# Patient Record
Sex: FEMALE | Race: WHITE | HISPANIC OR LATINO
[De-identification: names, ages, dates, MRNs, and addresses within clinical notes are randomized per-mention and may not be internally consistent; named-entity substitution may affect disease eponyms.]

---

## 2020-07-29 DIAGNOSIS — Z01.818 ENCOUNTER FOR OTHER PREPROCEDURAL EXAMINATION: ICD-10-CM

## 2020-07-31 ENCOUNTER — APPOINTMENT (OUTPATIENT)
Dept: DISASTER EMERGENCY | Facility: CLINIC | Age: 45
End: 2020-07-31

## 2020-07-31 LAB — SARS-COV-2 N GENE NPH QL NAA+PROBE: NOT DETECTED

## 2021-01-31 ENCOUNTER — TRANSCRIPTION ENCOUNTER (OUTPATIENT)
Age: 46
End: 2021-01-31

## 2021-05-25 ENCOUNTER — TRANSCRIPTION ENCOUNTER (OUTPATIENT)
Age: 46
End: 2021-05-25

## 2021-06-10 ENCOUNTER — EMERGENCY (EMERGENCY)
Facility: HOSPITAL | Age: 46
LOS: 1 days | Discharge: HOME | End: 2021-06-10
Attending: STUDENT IN AN ORGANIZED HEALTH CARE EDUCATION/TRAINING PROGRAM | Admitting: STUDENT IN AN ORGANIZED HEALTH CARE EDUCATION/TRAINING PROGRAM
Payer: COMMERCIAL

## 2021-06-10 VITALS
HEIGHT: 65 IN | WEIGHT: 138.01 LBS | TEMPERATURE: 98 F | SYSTOLIC BLOOD PRESSURE: 102 MMHG | DIASTOLIC BLOOD PRESSURE: 72 MMHG | RESPIRATION RATE: 16 BRPM | OXYGEN SATURATION: 99 % | HEART RATE: 74 BPM

## 2021-06-10 VITALS
SYSTOLIC BLOOD PRESSURE: 110 MMHG | OXYGEN SATURATION: 99 % | DIASTOLIC BLOOD PRESSURE: 69 MMHG | RESPIRATION RATE: 16 BRPM | TEMPERATURE: 98 F | HEART RATE: 75 BPM

## 2021-06-10 PROCEDURE — 99284 EMERGENCY DEPT VISIT MOD MDM: CPT | Mod: 25

## 2021-06-10 PROCEDURE — 73590 X-RAY EXAM OF LOWER LEG: CPT

## 2021-06-10 PROCEDURE — 99283 EMERGENCY DEPT VISIT LOW MDM: CPT | Mod: 25

## 2021-06-10 PROCEDURE — 73060 X-RAY EXAM OF HUMERUS: CPT

## 2021-06-10 PROCEDURE — 12001 RPR S/N/AX/GEN/TRNK 2.5CM/<: CPT

## 2021-06-10 PROCEDURE — 73590 X-RAY EXAM OF LOWER LEG: CPT | Mod: 26,RT

## 2021-06-10 PROCEDURE — 73610 X-RAY EXAM OF ANKLE: CPT

## 2021-06-10 PROCEDURE — 73060 X-RAY EXAM OF HUMERUS: CPT | Mod: 26,LT

## 2021-06-10 PROCEDURE — 73610 X-RAY EXAM OF ANKLE: CPT | Mod: 26,RT

## 2021-06-10 RX ORDER — CEPHALEXIN 500 MG
1 CAPSULE ORAL
Qty: 28 | Refills: 0
Start: 2021-06-10 | End: 2021-06-16

## 2021-06-10 NOTE — ED PROVIDER NOTE - PATIENT PORTAL LINK FT
You can access the FollowMyHealth Patient Portal offered by [facility] by registering at the following website: [email or web address]. By joining [employer]’s FollowMyHealth portal, you will also be able to view your health information using other applications (apps) compatible with our system.
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
## 2021-06-10 NOTE — ED PROVIDER NOTE - CARE PROVIDER_API CALL
Dayron Moreno)  Orthopaedic Surgery; Surgery of the Hand  [address] [place] [address]  Phone: [phone]  Fax: [phone]  Follow Up Time:     Francesco Camarena)  Plastic Surgery; Surgery of the Hand  [address], [address]  [place] [address]  Phone: [phone]  Fax: [phone]  Follow Up Time:

## 2021-06-10 NOTE — ED PROVIDER NOTE - NSFOLLOWUPINSTRUCTIONS_ED_ALL_ED_FT
keep laceration dry for 24 hours 2 week removal for stitches   follow up with hand or orthopedics   take antibiotics as directed  return to er for any worsening symptoms    Laceration    WHAT YOU NEED TO KNOW:    A laceration is an injury to the skin and the soft tissue underneath it. Lacerations can happen anywhere on the body.    DISCHARGE INSTRUCTIONS:    Return to the emergency department if:   •You have heavy bleeding or bleeding that does not stop after 10 minutes of holding firm, direct pressure over the wound.      •Your wound opens up.      Call your doctor if:   •You have a fever or chills.      •Your laceration is red, warm, or swollen.      •You have red streaks on your skin coming from your wound.      •You have white or yellow drainage from the wound that smells bad.      •You have pain that gets worse, even after treatment.      •You have questions or concerns about your condition or care.      Medicines: You may need any of the following:   •Prescription pain medicine may be given. Ask your healthcare provider how to take this medicine safely. Some prescription pain medicines contain acetaminophen. Do not take other medicines that contain acetaminophen without talking to your healthcare provider. Too much acetaminophen may cause liver damage. Prescription pain medicine may cause constipation. Ask your healthcare provider how to prevent or treat constipation.       •Antibiotics help treat or prevent a bacterial infection.      •Take your medicine as directed. Contact your healthcare provider if you think your medicine is not helping or if you have side effects. Tell him or her if you are allergic to any medicine. Keep a list of the medicines, vitamins, and herbs you take. Include the amounts, and when and why you take them. Bring the list or the pill bottles to follow-up visits. Carry your medicine list with you in case of an emergency.      Care for your wound as directed:   •Do not get your wound wet until your healthcare provider says it is okay. Do not soak your wound in water. Do not go swimming until your healthcare provider says it is okay. Carefully wash the wound with soap and water. Gently pat the area dry or allow it to air dry.      •Change your bandages when they get wet, dirty, or after washing. Apply new, clean bandages as directed. Do not apply elastic bandages or tape too tight. Do not put powders or lotions over your incision.      •Apply antibiotic ointment as directed. Your healthcare provider may give you antibiotic ointment to put over your wound if you have stitches. If you have strips of tape over your incision, let them dry up and fall off on their own. If they do not fall off within 14 days, gently remove them. If you have glue over your wound, do not remove or pick at it. If your glue comes off, do not replace it with glue that you have at home.      •Check your wound every day for signs of infection, such as swelling, redness, or pus.      Self-care:   •Apply ice on your wound for 15 to 20 minutes every hour or as directed. Use an ice pack, or put crushed ice in a plastic bag. Cover it with a towel. Ice helps prevent tissue damage and decreases swelling and pain.      •Use a splint as directed. A splint will decrease movement and stress on your wound. It may help it heal faster. A splint may be used for lacerations over joints or areas of your body that bend. Ask your healthcare provider how to apply and remove a splint.      •Decrease scarring of your wound by applying ointments as directed. Do not apply ointments until your healthcare provider says it is okay. You may need to wait until your wound is healed. Ask which ointment to buy and how often to use it. After your wound is healed, use sunscreen over the area when you are out in the sun. You should do this for at least 6 months to 1 year after your injury.      Follow up with your doctor as directed: You may need to follow up in 24 to 48 hours to have your wound checked for infection. You will need to return in 3 to 14 days if you have stitches or staples so they can be removed. Care for your wound as directed to prevent infection and help it heal. Write down your questions so you remember to ask them during your visits.
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
## 2021-06-10 NOTE — ED PROVIDER NOTE - MUSCULOSKELETAL, MLM
Spine appears normal, abrasion to right lower extremity swelling right lateral mild ttp nrom left inner bicep 1.5 cm laceration left 4th 5th digit decreased sensation and strength normal pulses

## 2021-06-10 NOTE — ED PROVIDER NOTE - PROGRESS NOTE DETAILS
spoke with ortho advised nothing emergent can f/u out pt wound repair done 2 week suture removal  spoke with Dr. Camarena advised f/u in one week  ace wrap for ankle bacitracin to shin

## 2021-06-10 NOTE — ED PROVIDER NOTE - CARE PLAN
Principal Discharge DX:	Laceration of left upper extremity with complication, initial encounter  Secondary Diagnosis:	Fall, initial encounter  Secondary Diagnosis:	Paresthesia

## 2021-06-10 NOTE — ED ADULT NURSE NOTE - NSIMPLEMENTINTERV_GEN_ALL_ED
Implemented All Fall Risk Interventions:  [place] to call system. Call bell, personal items and telephone within reach. Instruct patient to call for assistance. Room bathroom lighting operational. Non-slip footwear when patient is off stretcher. Physically safe environment: no spills, clutter or unnecessary equipment. Stretcher in lowest position, wheels locked, appropriate side rails in place. Provide visual cue, wrist band, yellow gown, etc. Monitor gait and stability. Monitor for mental status changes and reorient to person, place, and time. Review medications for side effects contributing to fall risk. Reinforce activity limits and safety measures with patient and family.
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
## 2021-07-06 ENCOUNTER — TRANSCRIPTION ENCOUNTER (OUTPATIENT)
Age: 46
End: 2021-07-06

## 2022-05-02 ENCOUNTER — NON-APPOINTMENT (OUTPATIENT)
Age: 47
End: 2022-05-02

## 2023-03-01 PROBLEM — Z78.9 OTHER SPECIFIED HEALTH STATUS: Chronic | Status: ACTIVE | Noted: 2021-06-10

## 2023-03-20 ENCOUNTER — APPOINTMENT (OUTPATIENT)
Dept: OTOLARYNGOLOGY | Facility: CLINIC | Age: 48
End: 2023-03-20
Payer: COMMERCIAL

## 2023-03-20 ENCOUNTER — NON-APPOINTMENT (OUTPATIENT)
Age: 48
End: 2023-03-20

## 2023-03-20 VITALS
OXYGEN SATURATION: 98 % | DIASTOLIC BLOOD PRESSURE: 68 MMHG | BODY MASS INDEX: 23.32 KG/M2 | HEIGHT: 65 IN | TEMPERATURE: 97.1 F | HEART RATE: 71 BPM | SYSTOLIC BLOOD PRESSURE: 110 MMHG | WEIGHT: 140 LBS

## 2023-03-20 DIAGNOSIS — J32.8 OTHER CHRONIC SINUSITIS: ICD-10-CM

## 2023-03-20 PROCEDURE — 31231 NASAL ENDOSCOPY DX: CPT

## 2023-03-20 PROCEDURE — 99203 OFFICE O/P NEW LOW 30 MIN: CPT | Mod: 25

## 2023-03-20 NOTE — PROCEDURE
[FreeTextEntry1] : Nasal endoscopy [FreeTextEntry2] : nasal congestion [FreeTextEntry3] : Procedure: nasal endoscopy \par \par Pre-operative diagnosis: \par \par Indication: Anterior rhinoscopy insufficient to diagnose pathology\par \par Details:\par After decongestant and lidocaine was sprayed in the bilateral nasal cavities, a flexible laryngoscope was inserted into the right nares. The nasal cavity, middle meatus, ETO, nasopharynx, and glottis were visualized. The endoscope was then inserted into the left nares and the nasal cavity, middle meatus, and ETO was visualized. The patient tolerated procedure well.\par \par Findings:\par BITH\par middle meatus edema\par right septal deviation, mild inferior

## 2023-03-20 NOTE — ASSESSMENT
[FreeTextEntry1] : 47 year old female with CRSsNP, chronic rhinitis, septal deviation but mild. We discussed allergy evaluation as well as CT sinus. Will return after.
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
## 2023-03-20 NOTE — PHYSICAL EXAM
[Midline] : trachea located in midline position [Normal] : no rashes [Nasal Endoscopy Performed] : nasal endoscopy was performed, see procedure section for findings [] : septum deviated to the right [de-identified] : bilateral positive stacie maneuver

## 2023-03-24 ENCOUNTER — APPOINTMENT (OUTPATIENT)
Dept: CT IMAGING | Facility: CLINIC | Age: 48
End: 2023-03-24
Payer: COMMERCIAL

## 2023-03-24 ENCOUNTER — OUTPATIENT (OUTPATIENT)
Dept: OUTPATIENT SERVICES | Facility: HOSPITAL | Age: 48
LOS: 1 days | End: 2023-03-24
Payer: COMMERCIAL

## 2023-03-24 DIAGNOSIS — J32.8 OTHER CHRONIC SINUSITIS: ICD-10-CM

## 2023-03-24 PROCEDURE — 70486 CT MAXILLOFACIAL W/O DYE: CPT | Mod: 26

## 2023-03-24 PROCEDURE — 70486 CT MAXILLOFACIAL W/O DYE: CPT

## 2023-04-13 ENCOUNTER — APPOINTMENT (OUTPATIENT)
Dept: OTOLARYNGOLOGY | Facility: CLINIC | Age: 48
End: 2023-04-13
Payer: COMMERCIAL

## 2023-04-13 VITALS
HEIGHT: 65 IN | WEIGHT: 140 LBS | DIASTOLIC BLOOD PRESSURE: 74 MMHG | OXYGEN SATURATION: 98 % | TEMPERATURE: 97.3 F | HEART RATE: 75 BPM | SYSTOLIC BLOOD PRESSURE: 112 MMHG | BODY MASS INDEX: 23.32 KG/M2

## 2023-04-13 DIAGNOSIS — J31.0 CHRONIC RHINITIS: ICD-10-CM

## 2023-04-13 PROCEDURE — 99212 OFFICE O/P EST SF 10 MIN: CPT

## 2023-04-13 RX ORDER — FLUTICASONE PROPIONATE 50 UG/1
50 SPRAY, METERED NASAL TWICE DAILY
Qty: 1 | Refills: 6 | Status: ACTIVE | COMMUNITY
Start: 2023-04-13 | End: 1900-01-01

## 2023-04-14 NOTE — PHYSICAL EXAM
[Nasal Endoscopy Performed] : nasal endoscopy was performed, see procedure section for findings [Midline] : trachea located in midline position [de-identified] : bilateral positive stacie maneuver, mild [Normal] : no rashes

## 2023-04-14 NOTE — ASSESSMENT
[FreeTextEntry1] : 47 year old female with chronic rhinitis - CT showing minimal sinus disease and midline septum. She has mild positive stacie maneuver. She will trial ipratropium and prescribed by allergy, as well as flonase. Return as needed.
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
## 2023-10-24 ENCOUNTER — EMERGENCY (EMERGENCY)
Facility: HOSPITAL | Age: 48
LOS: 1 days | Discharge: HOME | End: 2023-10-24
Attending: STUDENT IN AN ORGANIZED HEALTH CARE EDUCATION/TRAINING PROGRAM
Payer: COMMERCIAL

## 2023-10-24 VITALS
DIASTOLIC BLOOD PRESSURE: 82 MMHG | TEMPERATURE: 98 F | SYSTOLIC BLOOD PRESSURE: 122 MMHG | OXYGEN SATURATION: 99 % | RESPIRATION RATE: 18 BRPM | HEART RATE: 70 BPM

## 2023-10-24 VITALS
TEMPERATURE: 98 F | DIASTOLIC BLOOD PRESSURE: 76 MMHG | SYSTOLIC BLOOD PRESSURE: 120 MMHG | RESPIRATION RATE: 20 BRPM | OXYGEN SATURATION: 100 % | HEART RATE: 66 BPM

## 2023-10-24 LAB
ALBUMIN SERPL ELPH-MCNC: 3.8 G/DL — SIGNIFICANT CHANGE UP (ref 3.3–5)
ALBUMIN SERPL ELPH-MCNC: 3.8 G/DL — SIGNIFICANT CHANGE UP (ref 3.3–5)
ALBUMIN SERPL ELPH-MCNC: 4.3 G/DL — SIGNIFICANT CHANGE UP (ref 3.3–5)
ALBUMIN SERPL ELPH-MCNC: 4.3 G/DL — SIGNIFICANT CHANGE UP (ref 3.3–5)
ALP SERPL-CCNC: 47 U/L — SIGNIFICANT CHANGE UP (ref 40–120)
ALP SERPL-CCNC: 47 U/L — SIGNIFICANT CHANGE UP (ref 40–120)
ALP SERPL-CCNC: 50 U/L — SIGNIFICANT CHANGE UP (ref 40–120)
ALP SERPL-CCNC: 50 U/L — SIGNIFICANT CHANGE UP (ref 40–120)
ALT FLD-CCNC: 24 U/L — SIGNIFICANT CHANGE UP (ref 10–45)
ALT FLD-CCNC: 24 U/L — SIGNIFICANT CHANGE UP (ref 10–45)
ALT FLD-CCNC: 28 U/L — SIGNIFICANT CHANGE UP (ref 10–45)
ALT FLD-CCNC: 28 U/L — SIGNIFICANT CHANGE UP (ref 10–45)
ANION GAP SERPL CALC-SCNC: 11 MMOL/L — SIGNIFICANT CHANGE UP (ref 5–17)
ANION GAP SERPL CALC-SCNC: 11 MMOL/L — SIGNIFICANT CHANGE UP (ref 5–17)
ANION GAP SERPL CALC-SCNC: 13 MMOL/L — SIGNIFICANT CHANGE UP (ref 5–17)
ANION GAP SERPL CALC-SCNC: 13 MMOL/L — SIGNIFICANT CHANGE UP (ref 5–17)
APPEARANCE UR: CLEAR — SIGNIFICANT CHANGE UP
APPEARANCE UR: CLEAR — SIGNIFICANT CHANGE UP
APTT BLD: 20.3 SEC — LOW (ref 24.5–35.6)
APTT BLD: 20.3 SEC — LOW (ref 24.5–35.6)
AST SERPL-CCNC: 27 U/L — SIGNIFICANT CHANGE UP (ref 10–40)
AST SERPL-CCNC: 27 U/L — SIGNIFICANT CHANGE UP (ref 10–40)
AST SERPL-CCNC: 43 U/L — HIGH (ref 10–40)
AST SERPL-CCNC: 43 U/L — HIGH (ref 10–40)
BASE EXCESS BLDV CALC-SCNC: 1.4 MMOL/L — SIGNIFICANT CHANGE UP (ref -2–3)
BASE EXCESS BLDV CALC-SCNC: 1.4 MMOL/L — SIGNIFICANT CHANGE UP (ref -2–3)
BASOPHILS # BLD AUTO: 0.04 K/UL — SIGNIFICANT CHANGE UP (ref 0–0.2)
BASOPHILS # BLD AUTO: 0.04 K/UL — SIGNIFICANT CHANGE UP (ref 0–0.2)
BASOPHILS NFR BLD AUTO: 0.9 % — SIGNIFICANT CHANGE UP (ref 0–2)
BASOPHILS NFR BLD AUTO: 0.9 % — SIGNIFICANT CHANGE UP (ref 0–2)
BILIRUB SERPL-MCNC: 0.4 MG/DL — SIGNIFICANT CHANGE UP (ref 0.2–1.2)
BILIRUB SERPL-MCNC: 0.4 MG/DL — SIGNIFICANT CHANGE UP (ref 0.2–1.2)
BILIRUB SERPL-MCNC: 0.5 MG/DL — SIGNIFICANT CHANGE UP (ref 0.2–1.2)
BILIRUB SERPL-MCNC: 0.5 MG/DL — SIGNIFICANT CHANGE UP (ref 0.2–1.2)
BILIRUB UR-MCNC: NEGATIVE — SIGNIFICANT CHANGE UP
BILIRUB UR-MCNC: NEGATIVE — SIGNIFICANT CHANGE UP
BUN SERPL-MCNC: 8 MG/DL — SIGNIFICANT CHANGE UP (ref 7–23)
BUN SERPL-MCNC: 8 MG/DL — SIGNIFICANT CHANGE UP (ref 7–23)
BUN SERPL-MCNC: 9 MG/DL — SIGNIFICANT CHANGE UP (ref 7–23)
BUN SERPL-MCNC: 9 MG/DL — SIGNIFICANT CHANGE UP (ref 7–23)
CA-I SERPL-SCNC: 1.2 MMOL/L — SIGNIFICANT CHANGE UP (ref 1.15–1.33)
CA-I SERPL-SCNC: 1.2 MMOL/L — SIGNIFICANT CHANGE UP (ref 1.15–1.33)
CALCIUM SERPL-MCNC: 8.7 MG/DL — SIGNIFICANT CHANGE UP (ref 8.4–10.5)
CALCIUM SERPL-MCNC: 8.7 MG/DL — SIGNIFICANT CHANGE UP (ref 8.4–10.5)
CALCIUM SERPL-MCNC: 9.4 MG/DL — SIGNIFICANT CHANGE UP (ref 8.4–10.5)
CALCIUM SERPL-MCNC: 9.4 MG/DL — SIGNIFICANT CHANGE UP (ref 8.4–10.5)
CHLORIDE BLDV-SCNC: 105 MMOL/L — SIGNIFICANT CHANGE UP (ref 96–108)
CHLORIDE BLDV-SCNC: 105 MMOL/L — SIGNIFICANT CHANGE UP (ref 96–108)
CHLORIDE SERPL-SCNC: 101 MMOL/L — SIGNIFICANT CHANGE UP (ref 96–108)
CHLORIDE SERPL-SCNC: 101 MMOL/L — SIGNIFICANT CHANGE UP (ref 96–108)
CHLORIDE SERPL-SCNC: 106 MMOL/L — SIGNIFICANT CHANGE UP (ref 96–108)
CHLORIDE SERPL-SCNC: 106 MMOL/L — SIGNIFICANT CHANGE UP (ref 96–108)
CK SERPL-CCNC: 161 U/L — SIGNIFICANT CHANGE UP (ref 25–170)
CK SERPL-CCNC: 161 U/L — SIGNIFICANT CHANGE UP (ref 25–170)
CO2 BLDV-SCNC: 28 MMOL/L — HIGH (ref 22–26)
CO2 BLDV-SCNC: 28 MMOL/L — HIGH (ref 22–26)
CO2 SERPL-SCNC: 22 MMOL/L — SIGNIFICANT CHANGE UP (ref 22–31)
COLOR SPEC: COLORLESS — SIGNIFICANT CHANGE UP
COLOR SPEC: COLORLESS — SIGNIFICANT CHANGE UP
CREAT SERPL-MCNC: 0.53 MG/DL — SIGNIFICANT CHANGE UP (ref 0.5–1.3)
CREAT SERPL-MCNC: 0.53 MG/DL — SIGNIFICANT CHANGE UP (ref 0.5–1.3)
CREAT SERPL-MCNC: 0.59 MG/DL — SIGNIFICANT CHANGE UP (ref 0.5–1.3)
CREAT SERPL-MCNC: 0.59 MG/DL — SIGNIFICANT CHANGE UP (ref 0.5–1.3)
DIFF PNL FLD: NEGATIVE — SIGNIFICANT CHANGE UP
DIFF PNL FLD: NEGATIVE — SIGNIFICANT CHANGE UP
EGFR: 110 ML/MIN/1.73M2 — SIGNIFICANT CHANGE UP
EGFR: 110 ML/MIN/1.73M2 — SIGNIFICANT CHANGE UP
EGFR: 113 ML/MIN/1.73M2 — SIGNIFICANT CHANGE UP
EGFR: 113 ML/MIN/1.73M2 — SIGNIFICANT CHANGE UP
EOSINOPHIL # BLD AUTO: 0.13 K/UL — SIGNIFICANT CHANGE UP (ref 0–0.5)
EOSINOPHIL # BLD AUTO: 0.13 K/UL — SIGNIFICANT CHANGE UP (ref 0–0.5)
EOSINOPHIL NFR BLD AUTO: 2.8 % — SIGNIFICANT CHANGE UP (ref 0–6)
EOSINOPHIL NFR BLD AUTO: 2.8 % — SIGNIFICANT CHANGE UP (ref 0–6)
GAS PNL BLDV: 133 MMOL/L — LOW (ref 136–145)
GAS PNL BLDV: 133 MMOL/L — LOW (ref 136–145)
GAS PNL BLDV: SIGNIFICANT CHANGE UP
GAS PNL BLDV: SIGNIFICANT CHANGE UP
GLUCOSE BLDV-MCNC: 95 MG/DL — SIGNIFICANT CHANGE UP (ref 70–99)
GLUCOSE BLDV-MCNC: 95 MG/DL — SIGNIFICANT CHANGE UP (ref 70–99)
GLUCOSE SERPL-MCNC: 97 MG/DL — SIGNIFICANT CHANGE UP (ref 70–99)
GLUCOSE UR QL: NEGATIVE — SIGNIFICANT CHANGE UP
GLUCOSE UR QL: NEGATIVE — SIGNIFICANT CHANGE UP
HCG SERPL-ACNC: <2 MIU/ML — SIGNIFICANT CHANGE UP
HCG SERPL-ACNC: <2 MIU/ML — SIGNIFICANT CHANGE UP
HCO3 BLDV-SCNC: 27 MMOL/L — SIGNIFICANT CHANGE UP (ref 22–29)
HCO3 BLDV-SCNC: 27 MMOL/L — SIGNIFICANT CHANGE UP (ref 22–29)
HCT VFR BLD CALC: 38.1 % — SIGNIFICANT CHANGE UP (ref 34.5–45)
HCT VFR BLD CALC: 38.1 % — SIGNIFICANT CHANGE UP (ref 34.5–45)
HCT VFR BLDA CALC: 24 % — LOW (ref 34.5–46.5)
HCT VFR BLDA CALC: 24 % — LOW (ref 34.5–46.5)
HGB BLD CALC-MCNC: 7.9 G/DL — LOW (ref 11.7–16.1)
HGB BLD CALC-MCNC: 7.9 G/DL — LOW (ref 11.7–16.1)
HGB BLD-MCNC: 13 G/DL — SIGNIFICANT CHANGE UP (ref 11.5–15.5)
HGB BLD-MCNC: 13 G/DL — SIGNIFICANT CHANGE UP (ref 11.5–15.5)
IMM GRANULOCYTES NFR BLD AUTO: 0.2 % — SIGNIFICANT CHANGE UP (ref 0–0.9)
IMM GRANULOCYTES NFR BLD AUTO: 0.2 % — SIGNIFICANT CHANGE UP (ref 0–0.9)
INR BLD: 1.07 RATIO — SIGNIFICANT CHANGE UP (ref 0.85–1.18)
INR BLD: 1.07 RATIO — SIGNIFICANT CHANGE UP (ref 0.85–1.18)
KETONES UR-MCNC: NEGATIVE — SIGNIFICANT CHANGE UP
KETONES UR-MCNC: NEGATIVE — SIGNIFICANT CHANGE UP
LACTATE BLDV-MCNC: 1.6 MMOL/L — SIGNIFICANT CHANGE UP (ref 0.5–2)
LACTATE BLDV-MCNC: 1.6 MMOL/L — SIGNIFICANT CHANGE UP (ref 0.5–2)
LEUKOCYTE ESTERASE UR-ACNC: NEGATIVE — SIGNIFICANT CHANGE UP
LEUKOCYTE ESTERASE UR-ACNC: NEGATIVE — SIGNIFICANT CHANGE UP
LIDOCAIN IGE QN: 56 U/L — SIGNIFICANT CHANGE UP (ref 7–60)
LIDOCAIN IGE QN: 56 U/L — SIGNIFICANT CHANGE UP (ref 7–60)
LYMPHOCYTES # BLD AUTO: 1.26 K/UL — SIGNIFICANT CHANGE UP (ref 1–3.3)
LYMPHOCYTES # BLD AUTO: 1.26 K/UL — SIGNIFICANT CHANGE UP (ref 1–3.3)
LYMPHOCYTES # BLD AUTO: 27.1 % — SIGNIFICANT CHANGE UP (ref 13–44)
LYMPHOCYTES # BLD AUTO: 27.1 % — SIGNIFICANT CHANGE UP (ref 13–44)
MCHC RBC-ENTMCNC: 29.6 PG — SIGNIFICANT CHANGE UP (ref 27–34)
MCHC RBC-ENTMCNC: 29.6 PG — SIGNIFICANT CHANGE UP (ref 27–34)
MCHC RBC-ENTMCNC: 34.1 GM/DL — SIGNIFICANT CHANGE UP (ref 32–36)
MCHC RBC-ENTMCNC: 34.1 GM/DL — SIGNIFICANT CHANGE UP (ref 32–36)
MCV RBC AUTO: 86.8 FL — SIGNIFICANT CHANGE UP (ref 80–100)
MCV RBC AUTO: 86.8 FL — SIGNIFICANT CHANGE UP (ref 80–100)
MONOCYTES # BLD AUTO: 0.32 K/UL — SIGNIFICANT CHANGE UP (ref 0–0.9)
MONOCYTES # BLD AUTO: 0.32 K/UL — SIGNIFICANT CHANGE UP (ref 0–0.9)
MONOCYTES NFR BLD AUTO: 6.9 % — SIGNIFICANT CHANGE UP (ref 2–14)
MONOCYTES NFR BLD AUTO: 6.9 % — SIGNIFICANT CHANGE UP (ref 2–14)
NEUTROPHILS # BLD AUTO: 2.89 K/UL — SIGNIFICANT CHANGE UP (ref 1.8–7.4)
NEUTROPHILS # BLD AUTO: 2.89 K/UL — SIGNIFICANT CHANGE UP (ref 1.8–7.4)
NEUTROPHILS NFR BLD AUTO: 62.1 % — SIGNIFICANT CHANGE UP (ref 43–77)
NEUTROPHILS NFR BLD AUTO: 62.1 % — SIGNIFICANT CHANGE UP (ref 43–77)
NITRITE UR-MCNC: NEGATIVE — SIGNIFICANT CHANGE UP
NITRITE UR-MCNC: NEGATIVE — SIGNIFICANT CHANGE UP
NRBC # BLD: 0 /100 WBCS — SIGNIFICANT CHANGE UP (ref 0–0)
NRBC # BLD: 0 /100 WBCS — SIGNIFICANT CHANGE UP (ref 0–0)
PCO2 BLDV: 44 MMHG — HIGH (ref 39–42)
PCO2 BLDV: 44 MMHG — HIGH (ref 39–42)
PH BLDV: 7.39 — SIGNIFICANT CHANGE UP (ref 7.32–7.43)
PH BLDV: 7.39 — SIGNIFICANT CHANGE UP (ref 7.32–7.43)
PH UR: 7.5 — SIGNIFICANT CHANGE UP (ref 5–8)
PH UR: 7.5 — SIGNIFICANT CHANGE UP (ref 5–8)
PLATELET # BLD AUTO: 137 K/UL — LOW (ref 150–400)
PLATELET # BLD AUTO: 137 K/UL — LOW (ref 150–400)
PO2 BLDV: 66 MMHG — HIGH (ref 25–45)
PO2 BLDV: 66 MMHG — HIGH (ref 25–45)
POTASSIUM BLDV-SCNC: 6.5 MMOL/L — CRITICAL HIGH (ref 3.5–5.1)
POTASSIUM BLDV-SCNC: 6.5 MMOL/L — CRITICAL HIGH (ref 3.5–5.1)
POTASSIUM SERPL-MCNC: 4.1 MMOL/L — SIGNIFICANT CHANGE UP (ref 3.5–5.3)
POTASSIUM SERPL-MCNC: 4.1 MMOL/L — SIGNIFICANT CHANGE UP (ref 3.5–5.3)
POTASSIUM SERPL-MCNC: 5.4 MMOL/L — HIGH (ref 3.5–5.3)
POTASSIUM SERPL-MCNC: 5.4 MMOL/L — HIGH (ref 3.5–5.3)
POTASSIUM SERPL-SCNC: 4.1 MMOL/L — SIGNIFICANT CHANGE UP (ref 3.5–5.3)
POTASSIUM SERPL-SCNC: 4.1 MMOL/L — SIGNIFICANT CHANGE UP (ref 3.5–5.3)
POTASSIUM SERPL-SCNC: 5.4 MMOL/L — HIGH (ref 3.5–5.3)
POTASSIUM SERPL-SCNC: 5.4 MMOL/L — HIGH (ref 3.5–5.3)
PROT SERPL-MCNC: 6.4 G/DL — SIGNIFICANT CHANGE UP (ref 6–8.3)
PROT SERPL-MCNC: 6.4 G/DL — SIGNIFICANT CHANGE UP (ref 6–8.3)
PROT SERPL-MCNC: 7.3 G/DL — SIGNIFICANT CHANGE UP (ref 6–8.3)
PROT SERPL-MCNC: 7.3 G/DL — SIGNIFICANT CHANGE UP (ref 6–8.3)
PROT UR-MCNC: NEGATIVE — SIGNIFICANT CHANGE UP
PROT UR-MCNC: NEGATIVE — SIGNIFICANT CHANGE UP
PROTHROM AB SERPL-ACNC: 11.2 SEC — SIGNIFICANT CHANGE UP (ref 9.5–13)
PROTHROM AB SERPL-ACNC: 11.2 SEC — SIGNIFICANT CHANGE UP (ref 9.5–13)
RBC # BLD: 4.39 M/UL — SIGNIFICANT CHANGE UP (ref 3.8–5.2)
RBC # BLD: 4.39 M/UL — SIGNIFICANT CHANGE UP (ref 3.8–5.2)
RBC # FLD: 12.2 % — SIGNIFICANT CHANGE UP (ref 10.3–14.5)
RBC # FLD: 12.2 % — SIGNIFICANT CHANGE UP (ref 10.3–14.5)
SAO2 % BLDV: 94 % — HIGH (ref 67–88)
SAO2 % BLDV: 94 % — HIGH (ref 67–88)
SODIUM SERPL-SCNC: 136 MMOL/L — SIGNIFICANT CHANGE UP (ref 135–145)
SODIUM SERPL-SCNC: 136 MMOL/L — SIGNIFICANT CHANGE UP (ref 135–145)
SODIUM SERPL-SCNC: 139 MMOL/L — SIGNIFICANT CHANGE UP (ref 135–145)
SODIUM SERPL-SCNC: 139 MMOL/L — SIGNIFICANT CHANGE UP (ref 135–145)
SP GR SPEC: 1 — LOW (ref 1.01–1.02)
SP GR SPEC: 1 — LOW (ref 1.01–1.02)
UROBILINOGEN FLD QL: NEGATIVE — SIGNIFICANT CHANGE UP
UROBILINOGEN FLD QL: NEGATIVE — SIGNIFICANT CHANGE UP
WBC # BLD: 4.65 K/UL — SIGNIFICANT CHANGE UP (ref 3.8–10.5)
WBC # BLD: 4.65 K/UL — SIGNIFICANT CHANGE UP (ref 3.8–10.5)
WBC # FLD AUTO: 4.65 K/UL — SIGNIFICANT CHANGE UP (ref 3.8–10.5)
WBC # FLD AUTO: 4.65 K/UL — SIGNIFICANT CHANGE UP (ref 3.8–10.5)

## 2023-10-24 PROCEDURE — 85730 THROMBOPLASTIN TIME PARTIAL: CPT

## 2023-10-24 PROCEDURE — 85610 PROTHROMBIN TIME: CPT

## 2023-10-24 PROCEDURE — 36415 COLL VENOUS BLD VENIPUNCTURE: CPT

## 2023-10-24 PROCEDURE — 71045 X-RAY EXAM CHEST 1 VIEW: CPT

## 2023-10-24 PROCEDURE — 99053 MED SERV 10PM-8AM 24 HR FAC: CPT

## 2023-10-24 PROCEDURE — 74177 CT ABD & PELVIS W/CONTRAST: CPT | Mod: MG

## 2023-10-24 PROCEDURE — 73090 X-RAY EXAM OF FOREARM: CPT | Mod: 26,LT

## 2023-10-24 PROCEDURE — 71045 X-RAY EXAM CHEST 1 VIEW: CPT | Mod: 26

## 2023-10-24 PROCEDURE — 84132 ASSAY OF SERUM POTASSIUM: CPT

## 2023-10-24 PROCEDURE — 73060 X-RAY EXAM OF HUMERUS: CPT | Mod: 26,LT

## 2023-10-24 PROCEDURE — 85025 COMPLETE CBC W/AUTO DIFF WBC: CPT

## 2023-10-24 PROCEDURE — 81003 URINALYSIS AUTO W/O SCOPE: CPT

## 2023-10-24 PROCEDURE — 99285 EMERGENCY DEPT VISIT HI MDM: CPT | Mod: 25

## 2023-10-24 PROCEDURE — 70450 CT HEAD/BRAIN W/O DYE: CPT | Mod: 26,MA

## 2023-10-24 PROCEDURE — 83690 ASSAY OF LIPASE: CPT

## 2023-10-24 PROCEDURE — 74177 CT ABD & PELVIS W/CONTRAST: CPT | Mod: 26,MG

## 2023-10-24 PROCEDURE — 82435 ASSAY OF BLOOD CHLORIDE: CPT

## 2023-10-24 PROCEDURE — 82550 ASSAY OF CK (CPK): CPT

## 2023-10-24 PROCEDURE — 70450 CT HEAD/BRAIN W/O DYE: CPT | Mod: MA

## 2023-10-24 PROCEDURE — 85018 HEMOGLOBIN: CPT

## 2023-10-24 PROCEDURE — 99285 EMERGENCY DEPT VISIT HI MDM: CPT

## 2023-10-24 PROCEDURE — G1004: CPT

## 2023-10-24 PROCEDURE — 82803 BLOOD GASES ANY COMBINATION: CPT

## 2023-10-24 PROCEDURE — 73090 X-RAY EXAM OF FOREARM: CPT

## 2023-10-24 PROCEDURE — 72125 CT NECK SPINE W/O DYE: CPT | Mod: 26,MA

## 2023-10-24 PROCEDURE — 73030 X-RAY EXAM OF SHOULDER: CPT | Mod: 26,LT

## 2023-10-24 PROCEDURE — 73060 X-RAY EXAM OF HUMERUS: CPT

## 2023-10-24 PROCEDURE — 83605 ASSAY OF LACTIC ACID: CPT

## 2023-10-24 PROCEDURE — 80053 COMPREHEN METABOLIC PANEL: CPT

## 2023-10-24 PROCEDURE — 73030 X-RAY EXAM OF SHOULDER: CPT

## 2023-10-24 PROCEDURE — 84702 CHORIONIC GONADOTROPIN TEST: CPT

## 2023-10-24 PROCEDURE — 85014 HEMATOCRIT: CPT

## 2023-10-24 PROCEDURE — 73080 X-RAY EXAM OF ELBOW: CPT | Mod: 26,LT

## 2023-10-24 PROCEDURE — 82330 ASSAY OF CALCIUM: CPT

## 2023-10-24 PROCEDURE — 82947 ASSAY GLUCOSE BLOOD QUANT: CPT

## 2023-10-24 PROCEDURE — 72125 CT NECK SPINE W/O DYE: CPT | Mod: MA

## 2023-10-24 PROCEDURE — 71260 CT THORAX DX C+: CPT | Mod: 26,MG

## 2023-10-24 PROCEDURE — 71260 CT THORAX DX C+: CPT | Mod: MG

## 2023-10-24 PROCEDURE — 93005 ELECTROCARDIOGRAM TRACING: CPT

## 2023-10-24 PROCEDURE — 73080 X-RAY EXAM OF ELBOW: CPT

## 2023-10-24 PROCEDURE — 84295 ASSAY OF SERUM SODIUM: CPT

## 2023-10-24 PROCEDURE — 72170 X-RAY EXAM OF PELVIS: CPT

## 2023-10-24 PROCEDURE — 72170 X-RAY EXAM OF PELVIS: CPT | Mod: 26

## 2023-10-24 RX ORDER — SODIUM CHLORIDE 9 MG/ML
1000 INJECTION INTRAMUSCULAR; INTRAVENOUS; SUBCUTANEOUS ONCE
Refills: 0 | Status: COMPLETED | OUTPATIENT
Start: 2023-10-24 | End: 2023-10-24

## 2023-10-24 RX ORDER — ACETAMINOPHEN 500 MG
650 TABLET ORAL ONCE
Refills: 0 | Status: COMPLETED | OUTPATIENT
Start: 2023-10-24 | End: 2023-10-24

## 2023-10-24 RX ADMIN — SODIUM CHLORIDE 1000 MILLILITER(S): 9 INJECTION INTRAMUSCULAR; INTRAVENOUS; SUBCUTANEOUS at 07:46

## 2023-10-24 RX ADMIN — Medication 650 MILLIGRAM(S): at 07:46
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
## 2023-10-24 NOTE — ED PROVIDER NOTE - NSFOLLOWUPCLINICS_GEN_ALL_ED_FT
[facility]  Orthopedic Surgery  [address] [address]  Phone: [phone]  Fax:

## 2023-10-24 NOTE — ED PROVIDER NOTE - NSFOLLOWUPINSTRUCTIONS_ED_ALL_ED_FT
Musculoskeletal Pain    Your pain is likely due to musculoskeletal reasons. Take Tylenol as labeled on the pill bottles. Use ice to alleviate the pain (20 minutes on, 20 minutes off). Do not put ice direct on the skin.    Return to the ED if you experience redness, warmth, increased swelling, chest pain, shortness of breath, or any other alarming symptoms. Please follow up with your orthopedic physician in the next 3-5 days for the Left shoulder pain, please wear the sling, only lift 5 pounds in the left arm.     If you still have persistent pain after 5-7 days after the injury please be re-evaluated as there could be a more severe diagnosis than just a strain. If you develop numbness, weakness, change in color of your extremities (turn blue or white), worsening pain please seek immediate medical care for repeat evaluation.      Your pain is likely due to musculoskeletal reasons. Take Tylenol as labeled on the pill bottles. Use ice to alleviate the pain (20 minutes on, 20 minutes off). Do not put ice direct on the skin.    Return to the ED if you experience redness, warmth, increased swelling, chest pain, shortness of breath, or any other alarming symptoms.      please follow up with primary care and OBGYN for "Fat density focus measuring 1.6 cm in the left adnexa, which may   represent an ovarian dermoid.  *  Uterine fibroid.  *  Right upper lobe pulmonary nodule measuring 4 mm."

## 2023-10-24 NOTE — ED ADULT NURSE NOTE - NSFALLUNIVINTERV_ED_ALL_ED
Bed/Stretcher in lowest position, wheels locked, appropriate side rails in place/Call bell, personal items and telephone in reach/Instruct patient to call for assistance before getting out of bed/chair/stretcher/Non-slip footwear applied when patient is off stretcher/[place] to call system/Physically safe environment - no spills, clutter or unnecessary equipment/Purposeful proactive rounding/Room/bathroom lighting operational, light cord in reach 135

## 2023-10-24 NOTE — ED PROVIDER NOTE - PROGRESS NOTE DETAILS
pt ambulatory w/ steady gait, has persistent L shoulder pain, informed of L adenexal lesion and the Pulm nodule

## 2023-10-24 NOTE — ED PROVIDER NOTE - PATIENT PORTAL LINK FT
You can access the FollowMyHealth Patient Portal offered by [facility] by registering at the following website: [email or web address]. By joining [employer]’s FollowMyHealth portal, you will also be able to view your health information using other applications (apps) compatible with our system.
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
## 2023-10-24 NOTE — ED ADULT NURSE NOTE - NS ED NURSE LEVEL OF CONSCIOUSNESS ORIENTATION
Well controlled, continue home medications and monitor blood pressure, adjust as needed.   Oriented - self; Oriented - place; Oriented - time

## 2023-10-24 NOTE — ED ADULT NURSE NOTE - CHPI ED NUR SYMPTOMS NEG
no crying/no decreased eating/drinking/no disorientation/no dizziness/no fussiness/no headache/no laceration/no loss of consciousness/no sleeping issues
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
## 2023-10-24 NOTE — ED PROVIDER NOTE - PHYSICAL EXAMINATION
GENERAL: NAD  HEENT:  Atraumatic  CHEST/LUNG: Chest rise equal bilaterally. Clear lung sounds  HEART: Regular rate and rhythm  ABDOMEN: Soft, Nontender, Nondistended  EXTREMITIES:  Extremities warm  PSYCH: A&Ox3  SKIN: No obvious rashes or lesions  MSK: No cervical spine TTP, able to range neck to the left and right/ No midline spinal TTP  NEUROLOGY: strength and sensation intact in all extremities. CN 2 - 12 intact. Finger to nose test intact. No pronator drift. Ambulatory without difficulty.

## 2025-05-22 NOTE — ED PROVIDER NOTE - WR ORDER DATE AND TIME 1
----- Message from Roya Trivedi MD sent at 5/22/2025  3:17 PM CDT -----    ----- Message from Snehal Haddad RN sent at 5/22/2025 11:27 AM CDT -----    Please call. HCG now negative. Last result was at another clininc and was low - 5.  Please call with first day of period starting so we can get her scheduled for [facility]   24-Oct-2023 07:20

## 2025-07-30 ENCOUNTER — TRANSCRIPTION ENCOUNTER (OUTPATIENT)
Age: 50
End: 2025-07-30